# Patient Record
Sex: MALE | Race: WHITE | URBAN - METROPOLITAN AREA
[De-identification: names, ages, dates, MRNs, and addresses within clinical notes are randomized per-mention and may not be internally consistent; named-entity substitution may affect disease eponyms.]

---

## 2017-07-27 ENCOUNTER — IMPORTED ENCOUNTER (OUTPATIENT)
Dept: URBAN - METROPOLITAN AREA CLINIC 9 | Facility: CLINIC | Age: 77
End: 2017-07-27

## 2018-04-25 ENCOUNTER — IMPORTED ENCOUNTER (OUTPATIENT)
Dept: URBAN - METROPOLITAN AREA CLINIC 9 | Facility: CLINIC | Age: 78
End: 2018-04-25

## 2018-10-25 ENCOUNTER — IMPORTED ENCOUNTER (OUTPATIENT)
Dept: URBAN - METROPOLITAN AREA CLINIC 9 | Facility: CLINIC | Age: 78
End: 2018-10-25

## 2019-04-30 ENCOUNTER — IMPORTED ENCOUNTER (OUTPATIENT)
Dept: URBAN - METROPOLITAN AREA CLINIC 9 | Facility: CLINIC | Age: 79
End: 2019-04-30

## 2020-07-28 ENCOUNTER — IMPORTED ENCOUNTER (OUTPATIENT)
Dept: URBAN - METROPOLITAN AREA CLINIC 9 | Facility: CLINIC | Age: 80
End: 2020-07-28

## 2021-10-07 NOTE — PATIENT DISCUSSION
Good response to Combigan however patient feeling lethargic and may be due to drops. DC Combigan and trial Simbrinza bid OU.

## 2021-10-15 ASSESSMENT — TONOMETRY
OS_IOP_MMHG: 14
OD_IOP_MMHG: 13
OD_IOP_MMHG: 13
OS_IOP_MMHG: 12
OS_IOP_MMHG: 10
OS_IOP_MMHG: 12
OD_IOP_MMHG: 12
OD_IOP_MMHG: 11
OD_IOP_MMHG: 14
OS_IOP_MMHG: 13

## 2021-10-15 ASSESSMENT — VISUAL ACUITY
OD_CC: 20/30 + SN
OD_CC: 20/20 -2 SN
OD_CC: 20/25 -2 SN
OS_CC: 20/25 - SN
OS_CC: 20/20 -2 SN
OD_SC: 20/60 - SN
OS_CC: 20/25 SN
OD_CC: 20/20 SN
OD_CC: 20/25 -2 SN
OD_CC: 20/20 - SN
OS_CC: 20/20 SN
OS_CC: 20/20 SN
OS_CC: 20/30 SN
OS_CC: 20/30 SN
OD_CC: 20/30 SN
OD_CC: 20/25 - SN
OS_CC: 20/20 -2 SN
OS_SC: 20/50 + SN

## 2021-10-15 ASSESSMENT — KERATOMETRY
OD_K2POWER_DIOPTERS: 44.75
OD_K1POWER_DIOPTERS: 43
OD_AXISANGLE2_DEGREES: 9
OD_AXISANGLE_DEGREES: 99
OS_K2POWER_DIOPTERS: 44.75
OS_AXISANGLE_DEGREES: 85
OS_K1POWER_DIOPTERS: 42.25
OS_AXISANGLE2_DEGREES: 175